# Patient Record
Sex: MALE | Race: WHITE | NOT HISPANIC OR LATINO
[De-identification: names, ages, dates, MRNs, and addresses within clinical notes are randomized per-mention and may not be internally consistent; named-entity substitution may affect disease eponyms.]

---

## 2018-08-30 ENCOUNTER — MEDICATION RENEWAL (OUTPATIENT)
Age: 83
End: 2018-08-30

## 2018-09-16 DIAGNOSIS — Z87.891 PERSONAL HISTORY OF NICOTINE DEPENDENCE: ICD-10-CM

## 2018-09-16 DIAGNOSIS — Z86.39 PERSONAL HISTORY OF OTHER ENDOCRINE, NUTRITIONAL AND METABOLIC DISEASE: ICD-10-CM

## 2018-09-16 DIAGNOSIS — Z86.59 PERSONAL HISTORY OF OTHER MENTAL AND BEHAVIORAL DISORDERS: ICD-10-CM

## 2018-09-16 DIAGNOSIS — M48.061 SPINAL STENOSIS, LUMBAR REGION WITHOUT NEUROGENIC CLAUDICATION: ICD-10-CM

## 2018-09-16 DIAGNOSIS — Z87.898 PERSONAL HISTORY OF OTHER SPECIFIED CONDITIONS: ICD-10-CM

## 2018-09-16 DIAGNOSIS — Z80.0 FAMILY HISTORY OF MALIGNANT NEOPLASM OF DIGESTIVE ORGANS: ICD-10-CM

## 2018-09-16 DIAGNOSIS — Z86.79 PERSONAL HISTORY OF OTHER DISEASES OF THE CIRCULATORY SYSTEM: ICD-10-CM

## 2018-09-16 RX ORDER — ZOLPIDEM TARTRATE 5 MG/1
5 TABLET, FILM COATED ORAL
Refills: 0 | Status: ACTIVE | COMMUNITY

## 2018-09-16 RX ORDER — LEVETIRACETAM 250 MG/1
250 TABLET, FILM COATED ORAL TWICE DAILY
Refills: 0 | Status: ACTIVE | COMMUNITY

## 2018-09-16 RX ORDER — LEVOTHYROXINE SODIUM 0.05 MG/1
50 TABLET ORAL DAILY
Refills: 0 | Status: ACTIVE | COMMUNITY

## 2018-09-18 ENCOUNTER — APPOINTMENT (OUTPATIENT)
Dept: CARDIOLOGY | Facility: CLINIC | Age: 83
End: 2018-09-18

## 2018-09-24 ENCOUNTER — NON-APPOINTMENT (OUTPATIENT)
Age: 83
End: 2018-09-24

## 2018-09-24 ENCOUNTER — APPOINTMENT (OUTPATIENT)
Dept: CARDIOLOGY | Facility: CLINIC | Age: 83
End: 2018-09-24

## 2018-09-24 VITALS
BODY MASS INDEX: 28.49 KG/M2 | HEART RATE: 55 BPM | TEMPERATURE: 97.9 F | OXYGEN SATURATION: 98 % | DIASTOLIC BLOOD PRESSURE: 80 MMHG | RESPIRATION RATE: 12 BRPM | WEIGHT: 188 LBS | HEIGHT: 68 IN | SYSTOLIC BLOOD PRESSURE: 155 MMHG

## 2018-09-24 DIAGNOSIS — M50.90 CERVICAL DISC DISORDER, UNSPECIFIED, UNSPECIFIED CERVICAL REGION: ICD-10-CM

## 2018-09-24 DIAGNOSIS — Z86.69 PERSONAL HISTORY OF OTHER DISEASES OF THE NERVOUS SYSTEM AND SENSE ORGANS: ICD-10-CM

## 2018-10-17 ENCOUNTER — LABORATORY RESULT (OUTPATIENT)
Age: 83
End: 2018-10-17

## 2018-10-17 ENCOUNTER — APPOINTMENT (OUTPATIENT)
Dept: CARDIOLOGY | Facility: CLINIC | Age: 83
End: 2018-10-17

## 2018-10-17 VITALS
TEMPERATURE: 97 F | DIASTOLIC BLOOD PRESSURE: 70 MMHG | SYSTOLIC BLOOD PRESSURE: 140 MMHG | RESPIRATION RATE: 12 BRPM | WEIGHT: 193 LBS | HEART RATE: 51 BPM | BODY MASS INDEX: 30.29 KG/M2 | HEIGHT: 67 IN | OXYGEN SATURATION: 98 %

## 2018-10-18 LAB
ALBUMIN SERPL ELPH-MCNC: 4.4 G/DL
ALP BLD-CCNC: 109 U/L
ALT SERPL-CCNC: 20 U/L
ANION GAP SERPL CALC-SCNC: 13 MMOL/L
AST SERPL-CCNC: 32 U/L
BASOPHILS # BLD AUTO: 0.04 K/UL
BASOPHILS NFR BLD AUTO: 0.9 %
BILIRUB SERPL-MCNC: 0.7 MG/DL
BUN SERPL-MCNC: 14 MG/DL
CALCIUM SERPL-MCNC: 9.1 MG/DL
CHLORIDE SERPL-SCNC: 96 MMOL/L
CO2 SERPL-SCNC: 25 MMOL/L
CREAT SERPL-MCNC: 1.19 MG/DL
EOSINOPHIL # BLD AUTO: 0.04 K/UL
EOSINOPHIL NFR BLD AUTO: 0.9 %
GLUCOSE SERPL-MCNC: 81 MG/DL
HCT VFR BLD CALC: 37.1 %
HGB BLD-MCNC: 12 G/DL
LYMPHOCYTES # BLD AUTO: 0.4 K/UL
LYMPHOCYTES NFR BLD AUTO: 8.8 %
MAN DIFF?: NORMAL
MCHC RBC-ENTMCNC: 30.4 PG
MCHC RBC-ENTMCNC: 32.3 GM/DL
MCV RBC AUTO: 93.9 FL
MONOCYTES # BLD AUTO: 0.56 K/UL
MONOCYTES NFR BLD AUTO: 12.3 %
NEUTROPHILS # BLD AUTO: 3.31 K/UL
NEUTROPHILS NFR BLD AUTO: 72.7 %
PLATELET # BLD AUTO: 210 K/UL
POTASSIUM SERPL-SCNC: 4.6 MMOL/L
PROT SERPL-MCNC: 6.8 G/DL
RBC # BLD: 3.95 M/UL
RBC # FLD: 14.5 %
SODIUM SERPL-SCNC: 134 MMOL/L
WBC # FLD AUTO: 4.55 K/UL

## 2018-12-11 ENCOUNTER — MEDICATION RENEWAL (OUTPATIENT)
Age: 83
End: 2018-12-11

## 2019-04-15 ENCOUNTER — MEDICATION RENEWAL (OUTPATIENT)
Age: 84
End: 2019-04-15

## 2019-04-30 ENCOUNTER — NON-APPOINTMENT (OUTPATIENT)
Age: 84
End: 2019-04-30

## 2019-04-30 ENCOUNTER — APPOINTMENT (OUTPATIENT)
Dept: CARDIOLOGY | Facility: CLINIC | Age: 84
End: 2019-04-30
Payer: MEDICARE

## 2019-04-30 VITALS
RESPIRATION RATE: 12 BRPM | HEART RATE: 66 BPM | WEIGHT: 185 LBS | TEMPERATURE: 98.2 F | OXYGEN SATURATION: 95 % | BODY MASS INDEX: 29.03 KG/M2 | DIASTOLIC BLOOD PRESSURE: 68 MMHG | HEIGHT: 67 IN | SYSTOLIC BLOOD PRESSURE: 160 MMHG

## 2019-04-30 LAB — INR PPP: 2.2 RATIO

## 2019-04-30 PROCEDURE — 85610 PROTHROMBIN TIME: CPT | Mod: QW

## 2019-04-30 PROCEDURE — 93000 ELECTROCARDIOGRAM COMPLETE: CPT

## 2019-04-30 PROCEDURE — 36415 COLL VENOUS BLD VENIPUNCTURE: CPT

## 2019-04-30 PROCEDURE — 99214 OFFICE O/P EST MOD 30 MIN: CPT

## 2019-05-01 LAB
ALBUMIN SERPL ELPH-MCNC: 4.6 G/DL
ALP BLD-CCNC: 123 U/L
ALT SERPL-CCNC: 24 U/L
ANION GAP SERPL CALC-SCNC: 10 MMOL/L
AST SERPL-CCNC: 28 U/L
BASOPHILS # BLD AUTO: 0.05 K/UL
BASOPHILS NFR BLD AUTO: 0.8 %
BILIRUB SERPL-MCNC: 0.6 MG/DL
BUN SERPL-MCNC: 17 MG/DL
CALCIUM SERPL-MCNC: 9.8 MG/DL
CHLORIDE SERPL-SCNC: 93 MMOL/L
CHOLEST SERPL-MCNC: 125 MG/DL
CHOLEST/HDLC SERPL: 4.2 RATIO
CO2 SERPL-SCNC: 29 MMOL/L
CREAT SERPL-MCNC: 1.1 MG/DL
EOSINOPHIL # BLD AUTO: 0.13 K/UL
EOSINOPHIL NFR BLD AUTO: 2.1 %
GLUCOSE SERPL-MCNC: 92 MG/DL
HCT VFR BLD CALC: 39 %
HDLC SERPL-MCNC: 30 MG/DL
HGB BLD-MCNC: 13.2 G/DL
IMM GRANULOCYTES NFR BLD AUTO: 0.3 %
LDLC SERPL CALC-MCNC: 63 MG/DL
LYMPHOCYTES # BLD AUTO: 1.33 K/UL
LYMPHOCYTES NFR BLD AUTO: 21.2 %
MAN DIFF?: NORMAL
MCHC RBC-ENTMCNC: 32.2 PG
MCHC RBC-ENTMCNC: 33.8 GM/DL
MCV RBC AUTO: 95.1 FL
MONOCYTES # BLD AUTO: 0.82 K/UL
MONOCYTES NFR BLD AUTO: 13.1 %
NEUTROPHILS # BLD AUTO: 3.92 K/UL
NEUTROPHILS NFR BLD AUTO: 62.5 %
PLATELET # BLD AUTO: 232 K/UL
POTASSIUM SERPL-SCNC: 4.7 MMOL/L
PROT SERPL-MCNC: 7.4 G/DL
RBC # BLD: 4.1 M/UL
RBC # FLD: 13.8 %
SODIUM SERPL-SCNC: 132 MMOL/L
TRIGL SERPL-MCNC: 161 MG/DL
WBC # FLD AUTO: 6.27 K/UL

## 2019-05-01 NOTE — PHYSICAL EXAM
[General Appearance - Well Developed] : well developed [General Appearance - Well Nourished] : well nourished [General Appearance - In No Acute Distress] : no acute distress [Normal Conjunctiva] : the conjunctiva exhibited no abnormalities [No Oral Cyanosis] : no oral cyanosis [] : no respiratory distress [Respiration, Rhythm And Depth] : normal respiratory rhythm and effort [Auscultation Breath Sounds / Voice Sounds] : lungs were clear to auscultation bilaterally [Bowel Sounds] : normal bowel sounds [Abdomen Soft] : soft [Abdomen Tenderness] : non-tender [Nail Clubbing] : no clubbing of the fingernails [Cyanosis, Localized] : no localized cyanosis [Oriented To Time, Place, And Person] : oriented to person, place, and time [Affect] : the affect was normal [Mood] : the mood was normal [Normal Rate] : normal [Rhythm Regular] : regular [Normal S1] : normal S1 [Normal S2] : normal S2 [Prosthetic Mitral Valve] : prosthetic mitral valve heard [No Murmur] : no murmurs heard [No Pitting Edema] : no pitting edema present [PERRL] : pupils were equal in size, round, and reactive to light [Conjunctiva] : the conjunctiva were normal in both eyes [EOM Intact] : extraocular movements were intact [Normal Mental Status] : the patient was oriented to person, place and time [Appropriate] : appropriate [Normal] : the cranial nerves were intact [FreeTextEntry1] : No JVD present [S3] : no S3 [S4] : no S4 [Right Carotid Bruit] : no bruit heard over the right carotid [Left Carotid Bruit] : no bruit heard over the left carotid

## 2019-05-01 NOTE — ASSESSMENT
[FreeTextEntry1] : 84 yo male with mechanical MV (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 Patient is without chest pain, dyspnea, palpitations, or fevers.\par \par Blood pressure is currently not optimally controlled on carvedilol 12.5 mg po bid and HCTZ 12.5 mg po daily. \par Labs were drawn today to re-assess renal function and lytes with diuretic therapy. \par Will add amlodipine 5 mg po daily for additional BP control.\par \par Atrial fibrillation is currently rate controlled. INR today was 2.2. Patient was instructed to take 7.5 mg today and then resume 5 mg po daily. Repeat INR in 2 weeks. Goal INR 2.5-3.5. \par \par Will repeat echo to reassess MV bioprosthesis.\par \par No evidence of ischemia per 9/2014 nuclear stress test, and no symptoms to suggest progression of CAD. Will continue to monitor on current medical therapy. Patient is currently not on statin therapy due to intolerance and low LDL levels without use of meds.

## 2019-05-01 NOTE — HISTORY OF PRESENT ILLNESS
[FreeTextEntry1] : 84 yo male with mechanical MV (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 Patient presents today for follow-up. He reports that he has been experiencing increased gastric bloating and early satiety since the end of March with no significant improvement in anti-gas medications. He reports having a positive Cologard test and is scheduled to see Dr. Robert Franklin (GI) on 5/6/19 for further evaluation. He report normal bowel movements. He also repors an episode of transient slurred speech last week which lasted for 1 hour before resolving. He has not checked his INR in ~ 6 weeks but reports compliance with his medications. Patient denies chest pain, dyspnea, palpitations, syncope, edema, melena, hematochezia, or hematemesis.\par \par PMD: Butch Hinds MD

## 2019-05-14 ENCOUNTER — APPOINTMENT (OUTPATIENT)
Dept: CARDIOLOGY | Facility: CLINIC | Age: 84
End: 2019-05-14
Payer: MEDICARE

## 2019-05-14 PROCEDURE — 93306 TTE W/DOPPLER COMPLETE: CPT

## 2019-05-16 ENCOUNTER — APPOINTMENT (OUTPATIENT)
Dept: CARDIOLOGY | Facility: CLINIC | Age: 84
End: 2019-05-16

## 2019-05-16 ENCOUNTER — APPOINTMENT (OUTPATIENT)
Dept: CARDIOLOGY | Facility: CLINIC | Age: 84
End: 2019-05-16
Payer: MEDICARE

## 2019-05-16 VITALS
HEART RATE: 55 BPM | WEIGHT: 189.5 LBS | OXYGEN SATURATION: 97 % | RESPIRATION RATE: 12 BRPM | BODY MASS INDEX: 29.68 KG/M2 | DIASTOLIC BLOOD PRESSURE: 68 MMHG | SYSTOLIC BLOOD PRESSURE: 136 MMHG

## 2019-05-16 LAB — INR PPP: 2.9 RATIO

## 2019-05-16 PROCEDURE — 99214 OFFICE O/P EST MOD 30 MIN: CPT

## 2019-05-16 PROCEDURE — 85610 PROTHROMBIN TIME: CPT | Mod: QW

## 2019-05-16 NOTE — REASON FOR VISIT
[Follow-Up - Clinic] : a clinic follow-up of [Coronary Artery Disease] : coronary artery disease [Hypertension] : hypertension [Prosthetic Valve] : a prosthetic valve

## 2019-05-16 NOTE — DISCUSSION/SUMMARY
[___ Week(s)] : [unfilled] week(s) [Optimized for Surgery] : the patient is optimized for surgery [Continue] : Continue medications as currently directed

## 2019-05-17 NOTE — REVIEW OF SYSTEMS
[see HPI] : see HPI [Negative] : Endocrine [Feeling Fatigued] : feeling fatigued [Dyspnea on exertion] : dyspnea during exertion [Easy Bruising] : a tendency for easy bruising [Chest Pain] : no chest pain [Lower Ext Edema] : no extremity edema [Leg Claudication] : no intermittent leg claudication [Palpitations] : no palpitations

## 2019-05-17 NOTE — HISTORY OF PRESENT ILLNESS
[Preoperative Visit] : for a medical evaluation prior to surgery [Scheduled Procedure ___] : a [unfilled] [Date of Surgery ___] : on [unfilled] [FreeTextEntry1] : \par 84 yo male with mechanical MV (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 Patient presents today for pre-op cardiac evaluation for pending colonoscopy with Dr. Robert Goldman for further evaluation due to positive Cologard test. He reports intermittent exertional dyspnea/fatigue when gardening over the past 2 weeks. Patient denies chest pain, palpitations, syncope, edema, melena, hematochezia, or hematemesis.\par \par PMD: Butch Hinds MD

## 2019-05-17 NOTE — ASSESSMENT
[FreeTextEntry1] : 82 yo male with mechanical MV (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 \par Patient is currently pending colonoscopy.\par \par Patient does not have any cardiac contraindications to his pending colonoscopy and may proceed without further cardiac evaluation. Patient does not require antibiotics for endocarditis prophylaxis for his colonoscopy.\par He was instructed to stop his coumadin 3 days prior to his colonoscopy date (to be determined). \par Given his mechanical MV and known atrial fibrillation, he will need to coverage with Lovenox following his colonoscopy until his INR becomes therapeutic. \par Rx for Lovenox was sent to patient's pharmacy. Patient to call the office and have my nurse demonstrated how to self-administer Lovenox. \par \par Blood pressure is currently controlled on carvedilol 12.5 mg po bid, HCTZ 12.5 mg po daily, and amlodipine 5 mg po daily. \par \par Atrial fibrillation is currently rate controlled. INR today was 2.9 today. Patient to continue coumadin 5 mg po daily. \par Goal INR 2.5-3.5. \par \par Patient's last nuclear stress test in 9/2014 was negative for ischemia. However, patient now reports intermittent exertional dyspnea over the past 2 weeks. \par Will repeat Lexiscan nuclear stress test for ischemic evaluation/risk stratification.\par Pending review of test results, will determine if further cardiac work-up or intervention is clinically indicated.\par Patient is currently not on statin therapy due to intolerance and low LDL levels without use of meds.

## 2019-05-17 NOTE — PHYSICAL EXAM
[General Appearance - Well Developed] : well developed [General Appearance - Well Nourished] : well nourished [General Appearance - In No Acute Distress] : no acute distress [No Oral Cyanosis] : no oral cyanosis [Respiration, Rhythm And Depth] : normal respiratory rhythm and effort [] : no respiratory distress [Auscultation Breath Sounds / Voice Sounds] : lungs were clear to auscultation bilaterally [Bowel Sounds] : normal bowel sounds [Abdomen Soft] : soft [Abdomen Tenderness] : non-tender [Nail Clubbing] : no clubbing of the fingernails [Cyanosis, Localized] : no localized cyanosis [Conjunctiva] : the conjunctiva were normal in both eyes [PERRL] : pupils were equal in size, round, and reactive to light [EOM Intact] : extraocular movements were intact [Normal Mental Status] : the patient was oriented to person, place and time [Appropriate] : appropriate [Normal] : the cranial nerves were intact [Normal Rate] : normal [Rhythm Regular] : regular [Normal S1] : normal S1 [Normal S2] : normal S2 [Prosthetic Mitral Valve] : prosthetic mitral valve heard [No Murmur] : no murmurs heard [No Pitting Edema] : no pitting edema present [Normal Conjunctiva] : the conjunctiva exhibited no abnormalities [FreeTextEntry1] : No JVD present [S3] : no S3 [S4] : no S4 [Right Carotid Bruit] : no bruit heard over the right carotid [Left Carotid Bruit] : no bruit heard over the left carotid

## 2019-05-20 ENCOUNTER — INBOUND DOCUMENT (OUTPATIENT)
Age: 84
End: 2019-05-20

## 2019-05-24 ENCOUNTER — RX RENEWAL (OUTPATIENT)
Age: 84
End: 2019-05-24

## 2019-05-28 ENCOUNTER — RX RENEWAL (OUTPATIENT)
Age: 84
End: 2019-05-28

## 2019-05-29 ENCOUNTER — MESSAGE (OUTPATIENT)
Age: 84
End: 2019-05-29

## 2019-06-04 ENCOUNTER — RESULT CHARGE (OUTPATIENT)
Age: 84
End: 2019-06-04

## 2019-06-05 ENCOUNTER — APPOINTMENT (OUTPATIENT)
Dept: CARDIOLOGY | Facility: CLINIC | Age: 84
End: 2019-06-05
Payer: MEDICARE

## 2019-06-05 LAB — INR PPP: 1.6 RATIO

## 2019-06-05 PROCEDURE — 85610 PROTHROMBIN TIME: CPT | Mod: QW

## 2019-06-09 ENCOUNTER — TRANSCRIPTION ENCOUNTER (OUTPATIENT)
Age: 84
End: 2019-06-09

## 2019-06-20 ENCOUNTER — APPOINTMENT (OUTPATIENT)
Dept: CARDIOLOGY | Facility: CLINIC | Age: 84
End: 2019-06-20
Payer: MEDICARE

## 2019-06-20 LAB — INR PPP: 1.7 RATIO

## 2019-06-20 PROCEDURE — 85610 PROTHROMBIN TIME: CPT | Mod: QW

## 2019-07-03 ENCOUNTER — MEDICATION RENEWAL (OUTPATIENT)
Age: 84
End: 2019-07-03

## 2019-07-05 ENCOUNTER — APPOINTMENT (OUTPATIENT)
Dept: CARDIOLOGY | Facility: CLINIC | Age: 84
End: 2019-07-05
Payer: MEDICARE

## 2019-07-05 ENCOUNTER — RESULT CHARGE (OUTPATIENT)
Age: 84
End: 2019-07-05

## 2019-07-05 LAB — INR PPP: 2.8 RATIO

## 2019-07-05 PROCEDURE — 85610 PROTHROMBIN TIME: CPT | Mod: QW

## 2019-08-02 ENCOUNTER — APPOINTMENT (OUTPATIENT)
Dept: CARDIOLOGY | Facility: CLINIC | Age: 84
End: 2019-08-02
Payer: MEDICARE

## 2019-08-02 ENCOUNTER — NON-APPOINTMENT (OUTPATIENT)
Age: 84
End: 2019-08-02

## 2019-08-02 VITALS
RESPIRATION RATE: 12 BRPM | OXYGEN SATURATION: 96 % | WEIGHT: 189.38 LBS | HEART RATE: 58 BPM | BODY MASS INDEX: 29.66 KG/M2 | SYSTOLIC BLOOD PRESSURE: 138 MMHG | DIASTOLIC BLOOD PRESSURE: 77 MMHG

## 2019-08-02 LAB — INR PPP: 2.5 RATIO

## 2019-08-02 PROCEDURE — 93000 ELECTROCARDIOGRAM COMPLETE: CPT | Mod: NC

## 2019-08-02 PROCEDURE — 85610 PROTHROMBIN TIME: CPT | Mod: QW

## 2019-08-02 PROCEDURE — 99214 OFFICE O/P EST MOD 30 MIN: CPT

## 2019-08-02 NOTE — ASSESSMENT
[FreeTextEntry1] : 83 yo male with mechanical MV ([employer] 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 \par Patient is currently pending colonoscopy on 8/14/19. Patient is currently stable from cardiac standpoint.\par \par Patient does not have any cardiac contraindications to his pending colonoscopy and may proceed without further cardiac evaluation. \par Patient does not require antibiotics for endocarditis prophylaxis for his colonoscopy.\par Patient will return to clinic on 8/7/19 for repeat INR. Depending on INR level, will either discontinue coumadin at that time or 5 days prior to his procedure. Given his mechanical mitral valve and chronic atrial fibrillation, will want to keep period off anticoagulation to a minimum given his risk for thromboembolism.\par Patient had bleeding complications with bridging anticoagulation following his last colonoscopy. Post-colonoscopy, patient to resume his coumadin as soon as possible as per GI, but will avoid use of Lovenox this time.

## 2019-08-02 NOTE — HISTORY OF PRESENT ILLNESS
[Preoperative Visit] : for a medical evaluation prior to surgery [Scheduled Procedure ___] : a [unfilled] [Date of Surgery ___] : on [unfilled] [Stable] : Stable [Prior Anesthesia] : Prior anesthesia [FreeTextEntry1] : \par 83 yo male with mechanical MV ([employer] 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 Patient presents today for pre-op cardiac evaluation for pending colonoscopy with Dr. Robert Goldman on 8/14/19. Patient denies chest pain, palpitations, syncope, edema, melena, hematochezia, or hematemesis.\par \par PMD: Butch Hinds MD [Prev Anesthesia Reaction] : no previous anesthesia reaction

## 2019-08-02 NOTE — PHYSICAL EXAM
[General Appearance - Well Developed] : well developed [General Appearance - Well Nourished] : well nourished [General Appearance - In No Acute Distress] : no acute distress [Normal Conjunctiva] : the conjunctiva exhibited no abnormalities [No Oral Cyanosis] : no oral cyanosis [Respiration, Rhythm And Depth] : normal respiratory rhythm and effort [] : no respiratory distress [Auscultation Breath Sounds / Voice Sounds] : lungs were clear to auscultation bilaterally [Nail Clubbing] : no clubbing of the fingernails [Conjunctiva] : the conjunctiva were normal in both eyes [Cyanosis, Localized] : no localized cyanosis [PERRL] : pupils were equal in size, round, and reactive to light [EOM Intact] : extraocular movements were intact [Normal Mental Status] : the patient was oriented to person, place and time [Normal] : the cranial nerves were intact [Appropriate] : appropriate [Normal Rate] : normal [Normal S1] : normal S1 [Rhythm Regular] : regular [Normal S2] : normal S2 [Prosthetic Mitral Valve] : prosthetic mitral valve heard [No Murmur] : no murmurs heard [No Pitting Edema] : no pitting edema present [Oriented To Time, Place, And Person] : oriented to person, place, and time [Affect] : the affect was normal [Mood] : the mood was normal [FreeTextEntry1] : No JVD present [S3] : no S3 [Right Carotid Bruit] : no bruit heard over the right carotid [S4] : no S4 [Left Carotid Bruit] : no bruit heard over the left carotid

## 2019-08-07 ENCOUNTER — APPOINTMENT (OUTPATIENT)
Dept: CARDIOLOGY | Facility: CLINIC | Age: 84
End: 2019-08-07
Payer: MEDICARE

## 2019-08-07 ENCOUNTER — RESULT CHARGE (OUTPATIENT)
Age: 84
End: 2019-08-07

## 2019-08-07 LAB — INR PPP: 2.7 RATIO

## 2019-08-07 PROCEDURE — 85610 PROTHROMBIN TIME: CPT | Mod: QW

## 2019-08-12 ENCOUNTER — APPOINTMENT (OUTPATIENT)
Dept: CARDIOLOGY | Facility: CLINIC | Age: 84
End: 2019-08-12
Payer: MEDICARE

## 2019-08-12 LAB — INR PPP: 1.3 RATIO

## 2019-08-12 PROCEDURE — 85610 PROTHROMBIN TIME: CPT | Mod: QW

## 2019-08-22 ENCOUNTER — APPOINTMENT (OUTPATIENT)
Dept: CARDIOLOGY | Facility: CLINIC | Age: 84
End: 2019-08-22
Payer: MEDICARE

## 2019-08-22 LAB — INR PPP: 1.3 RATIO

## 2019-08-22 PROCEDURE — 85610 PROTHROMBIN TIME: CPT | Mod: QW

## 2019-08-23 ENCOUNTER — RESULT REVIEW (OUTPATIENT)
Age: 84
End: 2019-08-23

## 2019-08-26 ENCOUNTER — MESSAGE (OUTPATIENT)
Age: 84
End: 2019-08-26

## 2019-08-29 ENCOUNTER — APPOINTMENT (OUTPATIENT)
Dept: CARDIOLOGY | Facility: CLINIC | Age: 84
End: 2019-08-29
Payer: MEDICARE

## 2019-08-29 LAB — INR PPP: 2.2 RATIO

## 2019-08-29 PROCEDURE — 85610 PROTHROMBIN TIME: CPT | Mod: QW

## 2019-10-30 ENCOUNTER — APPOINTMENT (OUTPATIENT)
Dept: CARDIOLOGY | Facility: CLINIC | Age: 84
End: 2019-10-30
Payer: MEDICARE

## 2019-10-30 LAB — INR PPP: 2 RATIO

## 2019-10-30 PROCEDURE — 85610 PROTHROMBIN TIME: CPT | Mod: QW

## 2019-11-26 ENCOUNTER — APPOINTMENT (OUTPATIENT)
Dept: CARDIOLOGY | Facility: CLINIC | Age: 84
End: 2019-11-26
Payer: MEDICARE

## 2019-11-26 LAB — INR PPP: 2.2 RATIO

## 2019-11-26 PROCEDURE — 85610 PROTHROMBIN TIME: CPT | Mod: QW

## 2019-12-26 ENCOUNTER — APPOINTMENT (OUTPATIENT)
Dept: CARDIOLOGY | Facility: CLINIC | Age: 84
End: 2019-12-26
Payer: MEDICARE

## 2019-12-26 LAB — INR PPP: 2.3 RATIO

## 2019-12-26 PROCEDURE — 85610 PROTHROMBIN TIME: CPT | Mod: QW

## 2020-01-24 ENCOUNTER — APPOINTMENT (OUTPATIENT)
Dept: CARDIOLOGY | Facility: CLINIC | Age: 85
End: 2020-01-24
Payer: MEDICARE

## 2020-01-24 LAB — INR PPP: 2.2 RATIO

## 2020-01-24 PROCEDURE — 85610 PROTHROMBIN TIME: CPT | Mod: QW

## 2020-01-31 ENCOUNTER — APPOINTMENT (OUTPATIENT)
Dept: CARDIOLOGY | Facility: CLINIC | Age: 85
End: 2020-01-31
Payer: MEDICARE

## 2020-01-31 ENCOUNTER — NON-APPOINTMENT (OUTPATIENT)
Age: 85
End: 2020-01-31

## 2020-01-31 VITALS
SYSTOLIC BLOOD PRESSURE: 140 MMHG | BODY MASS INDEX: 28.66 KG/M2 | OXYGEN SATURATION: 98 % | RESPIRATION RATE: 12 BRPM | WEIGHT: 183 LBS | DIASTOLIC BLOOD PRESSURE: 70 MMHG | HEART RATE: 68 BPM

## 2020-01-31 PROCEDURE — 93000 ELECTROCARDIOGRAM COMPLETE: CPT

## 2020-01-31 PROCEDURE — 99214 OFFICE O/P EST MOD 30 MIN: CPT

## 2020-02-01 NOTE — ASSESSMENT
[FreeTextEntry1] : 83 yo male with mechanical MV ([employer] 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 \par Patient is currently pending colonoscopy on 2/21/20. Patient is currently stable from cardiac standpoint.\par \par Patient does not have any cardiac contraindications to his pending colonoscopy and may proceed without further cardiac evaluation. \par Patient does not require antibiotics for endocarditis prophylaxis for his colonoscopy.\par Patient was instructed to hold his coumadin on 2/11/20 and return for repeat INR on 2/18/20 to ensure that his INR is subtherapeutic prior to his colonoscopy on 2/21/20 given his reported difficulty with obtaining subtherapeutic INR back in Aug 2019 and required him to hold his coumadin for almost 2 weeks. \par Given his mechanical mitral valve and chronic atrial fibrillation, will want to keep period off anticoagulation to a minimum given his risk for thromboembolism.\par Patient had bleeding complications with bridging anticoagulation in the past. Post-colonoscopy, patient should resume his coumadin as soon as possible as per GI.\par \par Blood pressure is currently adequately controlled on carvedilol 12.5 mg po bid, HCTZ 12.5 mg po daily, and amlodipine 5 mg po daily.\par \par Atrial fibrillation is currently rate controlled. Will continue current rate control management and continue current anticoagulation regimen with alfonzo-op management as noted above.\par \par No evidence of ischemia per 9/2014 nuclear stress test, and no symptoms to suggest progression of CAD. Will continue to monitor on current medical therapy. Patient is currently not on statin therapy due to intolerance and low LDL levels without use of meds.

## 2020-02-01 NOTE — HISTORY OF PRESENT ILLNESS
[Preoperative Visit] : for a medical evaluation prior to surgery [Scheduled Procedure ___] : a [unfilled] [Date of Surgery ___] : on [unfilled] [Stable] : Stable [Prior Anesthesia] : Prior anesthesia [Prev Anesthesia Reaction] : no previous anesthesia reaction [FreeTextEntry1] : \par 83 yo male with mechanical MV ([facility] 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 Patient presents today for pre-op cardiac evaluation for pending colonoscopy with Dr. Robert Goldman on 2/21/20. Patient denies chest pain, palpitations, syncope, edema, melena, hematochezia, or hematemesis. He does report that prior to his last colonoscopy in 8/2019 he had to stop his coumadin almost 2 weeks in order to obtain subtherapeutic INR levels.\par \par PMD: Butch Hinds MD

## 2020-02-01 NOTE — PHYSICAL EXAM
[General Appearance - Well Developed] : well developed [General Appearance - Well Nourished] : well nourished [General Appearance - In No Acute Distress] : no acute distress [No Oral Cyanosis] : no oral cyanosis [Respiration, Rhythm And Depth] : normal respiratory rhythm and effort [] : no respiratory distress [Nail Clubbing] : no clubbing of the fingernails [Auscultation Breath Sounds / Voice Sounds] : lungs were clear to auscultation bilaterally [Cyanosis, Localized] : no localized cyanosis [Oriented To Time, Place, And Person] : oriented to person, place, and time [Affect] : the affect was normal [Mood] : the mood was normal [PERRL] : pupils were equal in size, round, and reactive to light [Conjunctiva] : the conjunctiva were normal in both eyes [EOM Intact] : extraocular movements were intact [Normal Mental Status] : the patient was oriented to person, place and time [Appropriate] : appropriate [Normal Rate] : normal [Normal] : the cranial nerves were intact [Normal S1] : normal S1 [Rhythm Regular] : regular [Normal S2] : normal S2 [Prosthetic Mitral Valve] : prosthetic mitral valve heard [No Murmur] : no murmurs heard [No Pitting Edema] : no pitting edema present [FreeTextEntry1] : No JVD present [S3] : no S3 [S4] : no S4 [Right Carotid Bruit] : no bruit heard over the right carotid [Left Carotid Bruit] : no bruit heard over the left carotid

## 2020-02-13 ENCOUNTER — TRANSCRIPTION ENCOUNTER (OUTPATIENT)
Age: 85
End: 2020-02-13

## 2020-02-13 ENCOUNTER — RX RENEWAL (OUTPATIENT)
Age: 85
End: 2020-02-13

## 2020-02-18 ENCOUNTER — APPOINTMENT (OUTPATIENT)
Dept: CARDIOLOGY | Facility: CLINIC | Age: 85
End: 2020-02-18
Payer: MEDICARE

## 2020-02-18 LAB — INR PPP: 1.1 RATIO

## 2020-02-18 PROCEDURE — 85610 PROTHROMBIN TIME: CPT | Mod: QW

## 2020-02-24 ENCOUNTER — APPOINTMENT (OUTPATIENT)
Dept: CARDIOLOGY | Facility: CLINIC | Age: 85
End: 2020-02-24
Payer: MEDICARE

## 2020-02-24 LAB — INR PPP: 1.2 RATIO

## 2020-02-24 PROCEDURE — 85610 PROTHROMBIN TIME: CPT | Mod: QW

## 2020-03-03 ENCOUNTER — APPOINTMENT (OUTPATIENT)
Dept: CARDIOLOGY | Facility: CLINIC | Age: 85
End: 2020-03-03
Payer: MEDICARE

## 2020-03-03 LAB — INR PPP: 2.8 RATIO

## 2020-03-03 PROCEDURE — 85610 PROTHROMBIN TIME: CPT | Mod: QW

## 2020-03-31 ENCOUNTER — APPOINTMENT (OUTPATIENT)
Dept: CARDIOLOGY | Facility: CLINIC | Age: 85
End: 2020-03-31
Payer: MEDICARE

## 2020-03-31 LAB — INR PPP: 2.4 RATIO

## 2020-03-31 PROCEDURE — 85610 PROTHROMBIN TIME: CPT | Mod: QW

## 2020-04-28 ENCOUNTER — APPOINTMENT (OUTPATIENT)
Dept: CARDIOLOGY | Facility: CLINIC | Age: 85
End: 2020-04-28
Payer: MEDICARE

## 2020-04-28 LAB — INR PPP: 2.7 RATIO

## 2020-04-28 PROCEDURE — 85610 PROTHROMBIN TIME: CPT | Mod: QW

## 2020-05-26 ENCOUNTER — APPOINTMENT (OUTPATIENT)
Dept: CARDIOLOGY | Facility: CLINIC | Age: 85
End: 2020-05-26
Payer: MEDICARE

## 2020-05-26 LAB — INR PPP: 2.5 RATIO

## 2020-05-26 PROCEDURE — 85610 PROTHROMBIN TIME: CPT | Mod: QW

## 2020-06-22 ENCOUNTER — RX RENEWAL (OUTPATIENT)
Age: 85
End: 2020-06-22

## 2020-06-23 ENCOUNTER — APPOINTMENT (OUTPATIENT)
Dept: CARDIOLOGY | Facility: CLINIC | Age: 85
End: 2020-06-23
Payer: MEDICARE

## 2020-06-23 LAB — INR PPP: 2.7 RATIO

## 2020-06-23 PROCEDURE — 85610 PROTHROMBIN TIME: CPT | Mod: QW

## 2020-07-21 ENCOUNTER — APPOINTMENT (OUTPATIENT)
Dept: CARDIOLOGY | Facility: CLINIC | Age: 85
End: 2020-07-21
Payer: MEDICARE

## 2020-07-21 ENCOUNTER — RESULT CHARGE (OUTPATIENT)
Age: 85
End: 2020-07-21

## 2020-07-21 LAB — INR PPP: 2.5 RATIO

## 2020-07-21 PROCEDURE — 85610 PROTHROMBIN TIME: CPT | Mod: QW

## 2020-08-18 ENCOUNTER — APPOINTMENT (OUTPATIENT)
Dept: CARDIOLOGY | Facility: CLINIC | Age: 85
End: 2020-08-18
Payer: MEDICARE

## 2020-08-18 LAB — INR PPP: 2.6 RATIO

## 2020-08-18 PROCEDURE — 85610 PROTHROMBIN TIME: CPT | Mod: QW

## 2020-09-15 ENCOUNTER — APPOINTMENT (OUTPATIENT)
Dept: CARDIOLOGY | Facility: CLINIC | Age: 85
End: 2020-09-15
Payer: MEDICARE

## 2020-09-15 ENCOUNTER — RESULT CHARGE (OUTPATIENT)
Age: 85
End: 2020-09-15

## 2020-09-15 LAB — INR PPP: 2.8 RATIO

## 2020-09-15 PROCEDURE — 85610 PROTHROMBIN TIME: CPT | Mod: QW

## 2020-10-26 ENCOUNTER — APPOINTMENT (OUTPATIENT)
Dept: CARDIOLOGY | Facility: CLINIC | Age: 85
End: 2020-10-26
Payer: MEDICARE

## 2020-10-26 LAB — INR PPP: 2.6 RATIO

## 2020-10-26 PROCEDURE — 85610 PROTHROMBIN TIME: CPT | Mod: QW

## 2020-10-26 PROCEDURE — 99072 ADDL SUPL MATRL&STAF TM PHE: CPT

## 2020-11-23 ENCOUNTER — APPOINTMENT (OUTPATIENT)
Dept: CARDIOLOGY | Facility: CLINIC | Age: 85
End: 2020-11-23
Payer: MEDICARE

## 2020-11-23 PROCEDURE — 85610 PROTHROMBIN TIME: CPT | Mod: QW

## 2020-11-24 LAB — INR PPP: 2.5 RATIO

## 2020-12-21 ENCOUNTER — APPOINTMENT (OUTPATIENT)
Dept: CARDIOLOGY | Facility: CLINIC | Age: 85
End: 2020-12-21
Payer: MEDICARE

## 2020-12-21 PROCEDURE — 85610 PROTHROMBIN TIME: CPT | Mod: QW

## 2020-12-21 PROCEDURE — 99072 ADDL SUPL MATRL&STAF TM PHE: CPT

## 2020-12-22 LAB — INR PPP: 2.5 RATIO

## 2021-01-01 ENCOUNTER — NON-APPOINTMENT (OUTPATIENT)
Age: 86
End: 2021-01-01

## 2021-01-01 ENCOUNTER — RX RENEWAL (OUTPATIENT)
Age: 86
End: 2021-01-01

## 2021-01-01 ENCOUNTER — TRANSCRIPTION ENCOUNTER (OUTPATIENT)
Age: 86
End: 2021-01-01

## 2021-01-01 ENCOUNTER — APPOINTMENT (OUTPATIENT)
Dept: CARDIOLOGY | Facility: CLINIC | Age: 86
End: 2021-01-01
Payer: MEDICARE

## 2021-01-01 DIAGNOSIS — I48.20 CHRONIC ATRIAL FIBRILLATION, UNSP: ICD-10-CM

## 2021-01-01 LAB
INR PPP: 1.3 RATIO
INR PPP: 2.4 RATIO
INR PPP: 2.7 RATIO
INR PPP: 3.3 RATIO

## 2021-01-01 PROCEDURE — 85610 PROTHROMBIN TIME: CPT | Mod: QW

## 2021-01-01 RX ORDER — WARFARIN 5 MG/1
5 TABLET ORAL
Refills: 0 | Status: ACTIVE | COMMUNITY

## 2021-01-01 RX ORDER — ALPRAZOLAM 0.25 MG/1
0.25 TABLET ORAL
Refills: 0 | Status: ACTIVE | COMMUNITY

## 2021-01-19 ENCOUNTER — APPOINTMENT (OUTPATIENT)
Dept: CARDIOLOGY | Facility: CLINIC | Age: 86
End: 2021-01-19
Payer: COMMERCIAL

## 2021-01-19 LAB — INR PPP: 2 RATIO

## 2021-01-19 PROCEDURE — 85610 PROTHROMBIN TIME: CPT | Mod: QW

## 2021-01-19 PROCEDURE — 99072 ADDL SUPL MATRL&STAF TM PHE: CPT

## 2021-02-03 ENCOUNTER — APPOINTMENT (OUTPATIENT)
Dept: CARDIOLOGY | Facility: CLINIC | Age: 86
End: 2021-02-03
Payer: COMMERCIAL

## 2021-02-03 LAB — INR PPP: 2.1 RATIO

## 2021-02-03 PROCEDURE — 85610 PROTHROMBIN TIME: CPT | Mod: QW

## 2021-02-03 PROCEDURE — 99072 ADDL SUPL MATRL&STAF TM PHE: CPT

## 2021-02-17 ENCOUNTER — APPOINTMENT (OUTPATIENT)
Dept: CARDIOLOGY | Facility: CLINIC | Age: 86
End: 2021-02-17
Payer: COMMERCIAL

## 2021-02-17 ENCOUNTER — NON-APPOINTMENT (OUTPATIENT)
Age: 86
End: 2021-02-17

## 2021-02-17 LAB — INR PPP: 3 RATIO

## 2021-02-17 PROCEDURE — 85610 PROTHROMBIN TIME: CPT | Mod: QW

## 2021-02-17 PROCEDURE — 99072 ADDL SUPL MATRL&STAF TM PHE: CPT

## 2021-03-04 ENCOUNTER — APPOINTMENT (OUTPATIENT)
Dept: CARDIOLOGY | Facility: CLINIC | Age: 86
End: 2021-03-04
Payer: COMMERCIAL

## 2021-03-04 LAB — INR PPP: 3.2 RATIO

## 2021-03-04 PROCEDURE — 99072 ADDL SUPL MATRL&STAF TM PHE: CPT

## 2021-03-04 PROCEDURE — 85610 PROTHROMBIN TIME: CPT | Mod: QW

## 2021-04-01 ENCOUNTER — APPOINTMENT (OUTPATIENT)
Dept: CARDIOLOGY | Facility: CLINIC | Age: 86
End: 2021-04-01
Payer: COMMERCIAL

## 2021-04-01 LAB — INR PPP: 2 RATIO

## 2021-04-01 PROCEDURE — 85610 PROTHROMBIN TIME: CPT | Mod: QW

## 2021-04-01 PROCEDURE — 99072 ADDL SUPL MATRL&STAF TM PHE: CPT

## 2021-04-15 ENCOUNTER — APPOINTMENT (OUTPATIENT)
Dept: CARDIOLOGY | Facility: CLINIC | Age: 86
End: 2021-04-15
Payer: COMMERCIAL

## 2021-04-15 ENCOUNTER — APPOINTMENT (OUTPATIENT)
Dept: CARDIOLOGY | Facility: CLINIC | Age: 86
End: 2021-04-15

## 2021-04-15 ENCOUNTER — NON-APPOINTMENT (OUTPATIENT)
Age: 86
End: 2021-04-15

## 2021-04-15 VITALS
DIASTOLIC BLOOD PRESSURE: 60 MMHG | OXYGEN SATURATION: 98 % | WEIGHT: 175 LBS | SYSTOLIC BLOOD PRESSURE: 140 MMHG | RESPIRATION RATE: 12 BRPM | HEART RATE: 78 BPM | BODY MASS INDEX: 27.41 KG/M2

## 2021-04-15 VITALS
SYSTOLIC BLOOD PRESSURE: 140 MMHG | WEIGHT: 175 LBS | BODY MASS INDEX: 27.41 KG/M2 | OXYGEN SATURATION: 98 % | HEART RATE: 58 BPM | DIASTOLIC BLOOD PRESSURE: 68 MMHG | TEMPERATURE: 98 F

## 2021-04-15 DIAGNOSIS — I83.93 ASYMPTOMATIC VARICOSE VEINS OF BILATERAL LOWER EXTREMITIES: ICD-10-CM

## 2021-04-15 DIAGNOSIS — Z01.810 ENCOUNTER FOR PREPROCEDURAL CARDIOVASCULAR EXAMINATION: ICD-10-CM

## 2021-04-15 LAB — INR PPP: 2.2 RATIO

## 2021-04-15 PROCEDURE — 85610 PROTHROMBIN TIME: CPT | Mod: QW

## 2021-04-15 PROCEDURE — 93000 ELECTROCARDIOGRAM COMPLETE: CPT | Mod: 59

## 2021-04-15 PROCEDURE — 99214 OFFICE O/P EST MOD 30 MIN: CPT

## 2021-04-15 PROCEDURE — 93246 EXT ECG>7D<15D RECORDING: CPT

## 2021-04-15 PROCEDURE — 99072 ADDL SUPL MATRL&STAF TM PHE: CPT

## 2021-04-16 LAB
ALBUMIN SERPL ELPH-MCNC: 4.4 G/DL
ALP BLD-CCNC: 119 U/L
ALT SERPL-CCNC: 25 U/L
ANION GAP SERPL CALC-SCNC: 14 MMOL/L
AST SERPL-CCNC: 35 U/L
BASOPHILS # BLD AUTO: 0.03 K/UL
BASOPHILS NFR BLD AUTO: 0.7 %
BILIRUB SERPL-MCNC: 0.9 MG/DL
BUN SERPL-MCNC: 15 MG/DL
CALCIUM SERPL-MCNC: 9.8 MG/DL
CHLORIDE SERPL-SCNC: 95 MMOL/L
CO2 SERPL-SCNC: 25 MMOL/L
CREAT SERPL-MCNC: 1.12 MG/DL
EOSINOPHIL # BLD AUTO: 0.1 K/UL
EOSINOPHIL NFR BLD AUTO: 2.3 %
GLUCOSE SERPL-MCNC: 120 MG/DL
HCT VFR BLD CALC: 36.1 %
HGB BLD-MCNC: 11.9 G/DL
IMM GRANULOCYTES NFR BLD AUTO: 0.2 %
LYMPHOCYTES # BLD AUTO: 0.83 K/UL
LYMPHOCYTES NFR BLD AUTO: 19.3 %
MAN DIFF?: NORMAL
MCHC RBC-ENTMCNC: 33 GM/DL
MCHC RBC-ENTMCNC: 33.1 PG
MCV RBC AUTO: 100.3 FL
MONOCYTES # BLD AUTO: 0.6 K/UL
MONOCYTES NFR BLD AUTO: 14 %
NEUTROPHILS # BLD AUTO: 2.73 K/UL
NEUTROPHILS NFR BLD AUTO: 63.5 %
PLATELET # BLD AUTO: 199 K/UL
POTASSIUM SERPL-SCNC: 5 MMOL/L
PROT SERPL-MCNC: 7 G/DL
RBC # BLD: 3.6 M/UL
RBC # FLD: 14.1 %
SODIUM SERPL-SCNC: 134 MMOL/L
T4 FREE SERPL-MCNC: 1.3 NG/DL
TSH SERPL-ACNC: 1.26 UIU/ML
WBC # FLD AUTO: 4.3 K/UL

## 2021-04-29 ENCOUNTER — APPOINTMENT (OUTPATIENT)
Dept: CARDIOLOGY | Facility: CLINIC | Age: 86
End: 2021-04-29
Payer: COMMERCIAL

## 2021-04-29 LAB — INR PPP: 2.8 RATIO

## 2021-04-29 PROCEDURE — 99072 ADDL SUPL MATRL&STAF TM PHE: CPT

## 2021-04-29 PROCEDURE — 85610 PROTHROMBIN TIME: CPT | Mod: QW

## 2021-05-03 ENCOUNTER — TRANSCRIPTION ENCOUNTER (OUTPATIENT)
Age: 86
End: 2021-05-03

## 2021-05-03 NOTE — ASSESSMENT
[FreeTextEntry1] : 86 yo male with mechanical MV (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 \par \par Patient with frequent symptomatic PVCs as demonstrated on ECG today in office.\par Zio XT monitor x 1 week was placed in the office to assess afib rate control and PVC burden.\par Will repeat echocardiogram to reassess LV function and mechanical MV prosthesis.\par Will check labs today (CBC, CMP, TSH, free T4).\par \par Blood pressure is currently mildly elevated on current regimen of carvedilol 12.5 mg po bid, HCTZ 12.5 mg po daily, and amlodipine 5 mg po daily. Depending on lab results, will increased HCTZ to 25 mg po daily if Cr and lytes are normal.\par \par Atrial fibrillation is currently rate controlled. Will continue current rate control management.\par INR was 2.2 today (goal 2.5 - 3.5). Will increase coumadin to 7.5 mg every Mon & Wed, 5 mg all other days. Will repeat INR in 2 weeks. \par \par No evidence of ischemia per 9/2014 nuclear stress test, and no exertional symptoms to suggest progression of CAD. Will continue to monitor on current medical therapy pending echo results.\par Patient is currently tolerating atorvastatin 10 mg po daily without complaints.

## 2021-05-03 NOTE — PHYSICAL EXAM
[General Appearance - Well Developed] : well developed [General Appearance - Well Nourished] : well nourished [General Appearance - In No Acute Distress] : no acute distress [No Oral Cyanosis] : no oral cyanosis [] : no respiratory distress [Respiration, Rhythm And Depth] : normal respiratory rhythm and effort [Auscultation Breath Sounds / Voice Sounds] : lungs were clear to auscultation bilaterally [Nail Clubbing] : no clubbing of the fingernails [Cyanosis, Localized] : no localized cyanosis [Oriented To Time, Place, And Person] : oriented to person, place, and time [Affect] : the affect was normal [Mood] : the mood was normal [Conjunctiva] : the conjunctiva were normal in both eyes [PERRL] : pupils were equal in size, round, and reactive to light [EOM Intact] : extraocular movements were intact [Normal Mental Status] : the patient was oriented to person, place and time [Appropriate] : appropriate [Normal] : the cranial nerves were intact [Normal Rate] : normal [Normal S1] : normal S1 [Normal S2] : normal S2 [Prosthetic Mitral Valve] : prosthetic mitral valve heard [No Murmur] : no murmurs heard [Irregularly Irregular] : irregularly irregular [___ +] : bilateral [unfilled]U+ pretibial pitting edema [FreeTextEntry1] : No JVD present [S3] : no S3 [S4] : no S4 [Right Carotid Bruit] : no bruit heard over the right carotid [Left Carotid Bruit] : no bruit heard over the left carotid

## 2021-05-03 NOTE — HISTORY OF PRESENT ILLNESS
[Preoperative Visit] : for a medical evaluation prior to surgery [Scheduled Procedure ___] : a [unfilled] [Date of Surgery ___] : on [unfilled] [Stable] : Stable [Prior Anesthesia] : Prior anesthesia [Prev Anesthesia Reaction] : no previous anesthesia reaction [FreeTextEntry1] : 84 yo male with mechanical MV (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 Patient presents today for follow-up. He reports increasing heart beat irregularity over the past 6 months and recently elimniated all caffeine use ~ 2 months ago. However, he continues to reports increasing "irregularity" but denies exertional chest pain or dyspnea. Patient denies chest pain, dyspnea, palpitations, syncope, edema, melena, hematochezia, or hematemesis.\par \par PMD: Dr. García

## 2021-05-03 NOTE — REASON FOR VISIT
[Follow-Up - Clinic] : a clinic follow-up of [Atrial Fibrillation] : atrial fibrillation [FreeTextEntry1] : "more irregular heart beats"

## 2021-05-05 ENCOUNTER — NON-APPOINTMENT (OUTPATIENT)
Age: 86
End: 2021-05-05

## 2021-05-05 PROCEDURE — 93248 EXT ECG>7D<15D REV&INTERPJ: CPT

## 2021-05-05 PROCEDURE — 99072 ADDL SUPL MATRL&STAF TM PHE: CPT

## 2021-05-18 ENCOUNTER — APPOINTMENT (OUTPATIENT)
Dept: CARDIOLOGY | Facility: CLINIC | Age: 86
End: 2021-05-18
Payer: COMMERCIAL

## 2021-05-18 LAB — INR PPP: 2.1 RATIO

## 2021-05-18 PROCEDURE — 99072 ADDL SUPL MATRL&STAF TM PHE: CPT

## 2021-05-18 PROCEDURE — 85610 PROTHROMBIN TIME: CPT | Mod: QW

## 2021-05-20 ENCOUNTER — RESULT REVIEW (OUTPATIENT)
Age: 86
End: 2021-05-20

## 2021-05-21 ENCOUNTER — NON-APPOINTMENT (OUTPATIENT)
Age: 86
End: 2021-05-21

## 2021-06-01 ENCOUNTER — APPOINTMENT (OUTPATIENT)
Dept: CARDIOLOGY | Facility: CLINIC | Age: 86
End: 2021-06-01
Payer: COMMERCIAL

## 2021-06-01 LAB — INR PPP: 2.4 RATIO

## 2021-06-01 PROCEDURE — 99072 ADDL SUPL MATRL&STAF TM PHE: CPT

## 2021-06-01 PROCEDURE — 85610 PROTHROMBIN TIME: CPT | Mod: QW

## 2021-06-08 ENCOUNTER — NON-APPOINTMENT (OUTPATIENT)
Age: 86
End: 2021-06-08

## 2021-06-10 ENCOUNTER — NON-APPOINTMENT (OUTPATIENT)
Age: 86
End: 2021-06-10

## 2021-06-10 ENCOUNTER — APPOINTMENT (OUTPATIENT)
Dept: CARDIOLOGY | Facility: CLINIC | Age: 86
End: 2021-06-10
Payer: COMMERCIAL

## 2021-06-10 VITALS
RESPIRATION RATE: 13 BRPM | OXYGEN SATURATION: 99 % | SYSTOLIC BLOOD PRESSURE: 138 MMHG | HEART RATE: 67 BPM | TEMPERATURE: 97.6 F | HEIGHT: 67 IN | WEIGHT: 171 LBS | DIASTOLIC BLOOD PRESSURE: 80 MMHG | BODY MASS INDEX: 26.84 KG/M2

## 2021-06-10 DIAGNOSIS — M54.12 RADICULOPATHY, CERVICAL REGION: ICD-10-CM

## 2021-06-10 DIAGNOSIS — I10 ESSENTIAL (PRIMARY) HYPERTENSION: ICD-10-CM

## 2021-06-10 DIAGNOSIS — I49.3 VENTRICULAR PREMATURE DEPOLARIZATION: ICD-10-CM

## 2021-06-10 DIAGNOSIS — I25.10 ATHEROSCLEROTIC HEART DISEASE OF NATIVE CORONARY ARTERY W/OUT ANGINA PECTORIS: ICD-10-CM

## 2021-06-10 PROCEDURE — 99214 OFFICE O/P EST MOD 30 MIN: CPT

## 2021-06-10 PROCEDURE — 93000 ELECTROCARDIOGRAM COMPLETE: CPT

## 2021-06-10 PROCEDURE — 99072 ADDL SUPL MATRL&STAF TM PHE: CPT

## 2021-06-10 NOTE — REVIEW OF SYSTEMS
[Palpitations] : palpitations [Negative] : Heme/Lymph [SOB] : no shortness of breath [Dyspnea on exertion] : not dyspnea during exertion [Lower Ext Edema] : no extremity edema [Orthopnea] : no orthopnea [Syncope] : no syncope [de-identified] : Left sided neck discomfort which radiates to his left arm and 3-5th fingers

## 2021-06-10 NOTE — PHYSICAL EXAM
[Normal Rate] : normal [Irregularly Irregular] : irregularly irregular [Normal S1] : normal S1 [Normal S2] : normal S2 [Prosthetic Mitral Valve] : prosthetic mitral valve heard [Well Developed] : well developed [Well Nourished] : well nourished [No Acute Distress] : no acute distress [Normal Conjunctiva] : normal conjunctiva [Normal Venous Pressure] : normal venous pressure [No Carotid Bruit] : no carotid bruit [Clear Lung Fields] : clear lung fields [Good Air Entry] : good air entry [No Respiratory Distress] : no respiratory distress  [Normal Gait] : normal gait [No Edema] : no edema [No Cyanosis] : no cyanosis [No Clubbing] : no clubbing [No Rash] : no rash [No Skin Lesions] : no skin lesions [Moves all extremities] : moves all extremities [No Focal Deficits] : no focal deficits [Normal Speech] : normal speech [Alert and Oriented] : alert and oriented [Normal memory] : normal memory [de-identified] : Non-distended

## 2021-06-10 NOTE — HISTORY OF PRESENT ILLNESS
[FreeTextEntry1] : 85 yo male with mechanical MV ([employer]) and CABG (SVG to RCA) in 2/2003, chronic atrial fibrillation, and MV prosthesis endocarditis (enterococcus bacteremia and MV prosthesis vegetation per 1/3/18 TIMA -> IV antibiotics), and frequent PVCs and non-sustaine VT per recent event monitor from 4/15/21 - 4/23/21. Patient presents today for evaluation after reported episode of dizziness on 6/8/21 and fell while going down stairs but fortunately did not sustain any trauma. He continues to report recurrent palpitations. In addition, he reports left sided neck pain with discomfort/numbness radiating to his left arm and 3-5th fingers. He denies exertional chest pain/dyspnea, syncope, edema, melena, hematochezia, or hematemesis.\par \par PMD: Dr. García

## 2021-06-10 NOTE — ASSESSMENT
[FreeTextEntry1] : 86 yo male with mechanical MV (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 \par \par Patient with frequent symptomatic PVCs as demonstrated on ECG today in office, and frequent PVCs and non-sustained VT per [employer] XT monitor from 4/15/21 to 4/23/21.\par Echocardiogram to be rescheduled to reassess LV function and mechanical MV prosthesis.\par Will continue carvedilol 12.5 mg po bid. Due to low HR at rest, cannot increase beta-blocker dose. \par Will refer patient to EP for further evaluation/management.\par \par Atrial fibrillation is currently rate controlled. Will continue current rate control management.\par INR was 2.4 on 6/1/21 (goal 2.5 - 3.5). Will continue coumadin 7.5 mg every Mon & Wed, 5 mg all other days. Repeat INR pending on 6/15/21.\par \par No evidence of ischemia per 5/20/21 nuclear stress test or exertional symptoms to suggest progression of CAD. \par Will continue to monitor on current medical therapy pending echo results.\par Patient is currently tolerating atorvastatin 10 mg po daily without complaints. \par \par Blood pressure is currently adequate. Will continue current regimen of carvedilol 12.5 mg po bid, HCTZ 12.5 mg po daily, and amlodipine 5 mg po daily. \par \par Patient with likely cervical radiculopathy given known cervical disk disease and persistent left sided neck discomfort and discomfort/numbness radiating down left arm to 3-5 fingers.\par Will refer patient to neurology at [facility] for further evaluation/management.

## 2021-06-10 NOTE — CARDIOLOGY SUMMARY
[de-identified] : \par 6/10/21 ECG: Atrial fibrillation, rate 66 bpm, RBBB, frequent PVCs in bigeminy pattern, cannot rule out inferior infarct\par \par Zio XT monitor from 4/15/21 to 4/23/21 demonstrated:\par 1) Atrial fibrillation with average HR 55 bpm (range  bpm). \par 2) Frequent PVCs and occasional ventricular couplets.\par 3) Frequent non-sustained runs of ventricular tachycardia (longest run was 9 beats).\par \par 4/15/21 ECG: Afib, rate 70 bpm, frequent PVCs, RBBB, LAD, cannot rule out old inferior infarct  \par 1/31/20 ECG: Afib, rate 64 bpm, RBBB, cannot rule out inferior infarct, LAD\par \par 8/14/14 Holter monitor: Afib with ave HR 68 bpm (range  bpm). Very frequent PVCs and frequent ventricular couplets. Rare runs of non-sustained VT with longest run of only 3 beats.\par  [de-identified] : \par 5/20/21 Regadenoson Myoview (at [facility]): No CP or ECG changes. Frequent PVCs were noted during stress. No ischemic or infarct per myocardial perfusion imaging with likely diaphragmatic attenuation artifact and apical thinning. Normal LV wall motion. LVEF 60%.\par \par 9/23/14 Regadenoson sestamibi: No CP or ECG changes. PVCs were noted during stress and recovery. No evidence of ischemia/infarct per myocardial perfusion imaging. Normal LV systolic function with LVEF 59%. \par \par 4/1/13 Treadmill sestamibi: No CP or ECG changes at 99% max pred HR, 7 METS. No ischemia per myocardial perfusion imaging. Normal LV systolic function, LVEF 54%.\par  [de-identified] : \par 5/14/19 Echo: Normal LV size and systolic function, LVEF 63%. Severe LAE. AV sclerosis. Mild to mod AR. 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 Mild MR. Mild to mod TR. Mild pulm HTN with PASP 40.5 mmHg. Mildly dilated aortic root (4 cm) and ascending aorta (4 cm). \par  \par 1/3/18 TIMA (at [facility]): Normal LV size and LVEF > 60%. Severe LAE. Mod SUSANNAH. No PFO. 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 Mild to mod MR. AV sclerosis. Mild AR. Mild TR. Moderate plaque in descending thoracic aorta and near aortic arch.\par \par 1/2/18 Echo (at [facility]): Normal LV size and systolic function. LVEF 61%. Severe LAE. Mod to severe SUSANNAH. AV sclerosis. Mechanical MV prosthesis with mean grad 5 mmHg (at 77 bpm) -> normal. Mod TR. Mild MO. Mildly dilated aortic root.\par \par 9/1/17 Echo: Normal LV systolic function, LVEF 64%. Mild LVH. Severe LAE. Mild to mod SUSANNAH. Mechanical MV prosthesis with normal function. AV sclerosis. Mod AR. Mild TR. Mild pulm HTN with PASP 34 mmHg.\par  [de-identified] : \par 2/18/03 Cardiac cath:\par Moderate to severe MR\par LVEF 64%\par 60% OM1\par 95% mid RCA

## 2021-06-15 ENCOUNTER — APPOINTMENT (OUTPATIENT)
Dept: CARDIOLOGY | Facility: CLINIC | Age: 86
End: 2021-06-15
Payer: COMMERCIAL

## 2021-06-15 LAB — INR PPP: 2.3 RATIO

## 2021-06-15 PROCEDURE — 85610 PROTHROMBIN TIME: CPT | Mod: QW

## 2021-06-15 PROCEDURE — 99072 ADDL SUPL MATRL&STAF TM PHE: CPT

## 2021-06-23 ENCOUNTER — APPOINTMENT (OUTPATIENT)
Dept: CARDIOLOGY | Facility: CLINIC | Age: 86
End: 2021-06-23
Payer: COMMERCIAL

## 2021-06-23 DIAGNOSIS — Z95.2 PRESENCE OF PROSTHETIC HEART VALVE: ICD-10-CM

## 2021-06-23 DIAGNOSIS — I47.2 VENTRICULAR TACHYCARDIA: ICD-10-CM

## 2021-06-23 PROCEDURE — 93306 TTE W/DOPPLER COMPLETE: CPT

## 2021-06-23 PROCEDURE — 99072 ADDL SUPL MATRL&STAF TM PHE: CPT

## 2021-06-24 ENCOUNTER — NON-APPOINTMENT (OUTPATIENT)
Age: 86
End: 2021-06-24

## 2021-06-29 ENCOUNTER — APPOINTMENT (OUTPATIENT)
Dept: CARDIOLOGY | Facility: CLINIC | Age: 86
End: 2021-06-29
Payer: COMMERCIAL

## 2021-06-29 LAB — INR PPP: 3.8 RATIO

## 2021-06-29 PROCEDURE — 99072 ADDL SUPL MATRL&STAF TM PHE: CPT

## 2021-06-29 PROCEDURE — 85610 PROTHROMBIN TIME: CPT | Mod: QW

## 2021-07-06 ENCOUNTER — APPOINTMENT (OUTPATIENT)
Dept: CARDIOLOGY | Facility: CLINIC | Age: 86
End: 2021-07-06
Payer: COMMERCIAL

## 2021-07-06 LAB — INR PPP: 2.6 RATIO

## 2021-07-06 PROCEDURE — 85610 PROTHROMBIN TIME: CPT | Mod: QW

## 2021-07-06 PROCEDURE — 99072 ADDL SUPL MATRL&STAF TM PHE: CPT

## 2021-07-12 ENCOUNTER — APPOINTMENT (OUTPATIENT)
Dept: HEART AND VASCULAR | Facility: CLINIC | Age: 86
End: 2021-07-12

## 2021-08-03 ENCOUNTER — APPOINTMENT (OUTPATIENT)
Dept: CARDIOLOGY | Facility: CLINIC | Age: 86
End: 2021-08-03
Payer: COMMERCIAL

## 2021-08-03 LAB — INR PPP: 3.6 RATIO

## 2021-08-03 PROCEDURE — 85610 PROTHROMBIN TIME: CPT | Mod: QW

## 2021-08-17 ENCOUNTER — APPOINTMENT (OUTPATIENT)
Dept: CARDIOLOGY | Facility: CLINIC | Age: 86
End: 2021-08-17
Payer: COMMERCIAL

## 2021-08-17 DIAGNOSIS — Z00.00 ENCOUNTER FOR GENERAL ADULT MEDICAL EXAMINATION W/OUT ABNORMAL FINDINGS: ICD-10-CM

## 2021-08-17 LAB — INR PPP: 2.4 RATIO

## 2021-08-17 PROCEDURE — 85610 PROTHROMBIN TIME: CPT | Mod: QW

## 2021-08-31 ENCOUNTER — APPOINTMENT (OUTPATIENT)
Dept: CARDIOLOGY | Facility: CLINIC | Age: 86
End: 2021-08-31
Payer: COMMERCIAL

## 2021-08-31 LAB — INR PPP: 2.8 RATIO

## 2021-08-31 PROCEDURE — 85610 PROTHROMBIN TIME: CPT | Mod: QW

## 2021-09-28 ENCOUNTER — APPOINTMENT (OUTPATIENT)
Dept: CARDIOLOGY | Facility: CLINIC | Age: 86
End: 2021-09-28
Payer: MEDICARE

## 2021-09-28 ENCOUNTER — RESULT CHARGE (OUTPATIENT)
Age: 86
End: 2021-09-28

## 2021-09-28 LAB — INR PPP: 2.8 RATIO

## 2021-09-28 PROCEDURE — 85610 PROTHROMBIN TIME: CPT | Mod: QW

## 2021-10-06 PROBLEM — I10 ESSENTIAL HYPERTENSION: Status: ACTIVE | Noted: 2018-09-16

## 2021-10-13 PROBLEM — I48.20 CHRONIC ATRIAL FIBRILLATION: Status: ACTIVE | Noted: 2018-08-30

## 2022-01-01 ENCOUNTER — APPOINTMENT (OUTPATIENT)
Dept: CARDIOLOGY | Facility: CLINIC | Age: 87
End: 2022-01-01
Payer: MEDICARE

## 2022-01-01 ENCOUNTER — RX RENEWAL (OUTPATIENT)
Age: 87
End: 2022-01-01

## 2022-01-01 ENCOUNTER — APPOINTMENT (OUTPATIENT)
Dept: CARDIOLOGY | Facility: CLINIC | Age: 87
End: 2022-01-01

## 2022-01-01 ENCOUNTER — RESULT CHARGE (OUTPATIENT)
Age: 87
End: 2022-01-01

## 2022-01-01 ENCOUNTER — NON-APPOINTMENT (OUTPATIENT)
Age: 87
End: 2022-01-01

## 2022-01-01 LAB
INR PPP: 2.1 RATIO
INR PPP: 2.2 RATIO
INR PPP: 2.3 RATIO
INR PPP: 2.4 RATIO
INR PPP: 2.5 RATIO
INR PPP: 2.8 RATIO
INR PPP: 3 RATIO
INR PPP: 3 RATIO

## 2022-01-01 PROCEDURE — 85610 PROTHROMBIN TIME: CPT | Mod: QW

## 2022-01-01 RX ORDER — HYDROCHLOROTHIAZIDE 12.5 MG/1
12.5 TABLET ORAL
Qty: 90 | Refills: 3 | Status: ACTIVE | COMMUNITY
Start: 2020-10-02 | End: 1900-01-01

## 2022-01-01 RX ORDER — AMLODIPINE BESYLATE 5 MG/1
5 TABLET ORAL
Qty: 90 | Refills: 3 | Status: ACTIVE | COMMUNITY
Start: 2019-04-30 | End: 1900-01-01

## 2022-10-10 ENCOUNTER — RX RENEWAL (OUTPATIENT)
Age: 87
End: 2022-10-10

## 2022-10-10 RX ORDER — ATORVASTATIN CALCIUM 10 MG/1
10 TABLET, FILM COATED ORAL
Qty: 90 | Refills: 3 | Status: ACTIVE | COMMUNITY
Start: 2019-08-22 | End: 1900-01-01

## 2022-10-10 RX ORDER — CARVEDILOL 12.5 MG/1
12.5 TABLET, FILM COATED ORAL
Qty: 180 | Refills: 3 | Status: ACTIVE | COMMUNITY
Start: 2018-08-30 | End: 1900-01-01

## 2024-06-13 NOTE — REASON FOR VISIT
Medication: Pantoprazole passed protocol.   Last office visit date: 6/10/24  Next appointment scheduled?: Yes   Number of refills given: 2     [Follow-Up - Clinic] : a clinic follow-up of [Coronary Artery Disease] : coronary artery disease [Hypertension] : hypertension [Prosthetic Valve] : a prosthetic valve